# Patient Record
Sex: FEMALE | Race: WHITE | NOT HISPANIC OR LATINO | Employment: OTHER | URBAN - METROPOLITAN AREA
[De-identification: names, ages, dates, MRNs, and addresses within clinical notes are randomized per-mention and may not be internally consistent; named-entity substitution may affect disease eponyms.]

---

## 2020-07-27 ENCOUNTER — TELEPHONE ENCOUNTER (OUTPATIENT)
Dept: URBAN - METROPOLITAN AREA CLINIC 9 | Facility: CLINIC | Age: 85
End: 2020-07-27

## 2020-10-05 ENCOUNTER — TELEPHONE ENCOUNTER (OUTPATIENT)
Dept: URBAN - METROPOLITAN AREA CLINIC 9 | Facility: CLINIC | Age: 85
End: 2020-10-05

## 2020-12-18 ENCOUNTER — TELEPHONE ENCOUNTER (OUTPATIENT)
Dept: URBAN - METROPOLITAN AREA CLINIC 9 | Facility: CLINIC | Age: 85
End: 2020-12-18

## 2021-04-02 ENCOUNTER — TELEPHONE ENCOUNTER (OUTPATIENT)
Dept: URBAN - METROPOLITAN AREA CLINIC 9 | Facility: CLINIC | Age: 86
End: 2021-04-02

## 2022-03-21 ENCOUNTER — TELEPHONE ENCOUNTER (OUTPATIENT)
Dept: URBAN - METROPOLITAN AREA CLINIC 9 | Facility: CLINIC | Age: 87
End: 2022-03-21

## 2022-07-30 ENCOUNTER — TELEPHONE ENCOUNTER (OUTPATIENT)
Age: 87
End: 2022-07-30

## 2022-07-31 ENCOUNTER — TELEPHONE ENCOUNTER (OUTPATIENT)
Age: 87
End: 2022-07-31

## 2023-06-05 ENCOUNTER — TELEPHONE ENCOUNTER (OUTPATIENT)
Dept: URBAN - METROPOLITAN AREA CLINIC 9 | Facility: CLINIC | Age: 88
End: 2023-06-05

## 2023-06-05 ENCOUNTER — WEB ENCOUNTER (OUTPATIENT)
Dept: URBAN - METROPOLITAN AREA CLINIC 9 | Facility: CLINIC | Age: 88
End: 2023-06-05

## 2023-06-05 ENCOUNTER — DASHBOARD ENCOUNTERS (OUTPATIENT)
Age: 88
End: 2023-06-05

## 2023-06-05 ENCOUNTER — OFFICE VISIT (OUTPATIENT)
Dept: URBAN - METROPOLITAN AREA CLINIC 9 | Facility: CLINIC | Age: 88
End: 2023-06-05
Payer: MEDICARE

## 2023-06-05 VITALS
SYSTOLIC BLOOD PRESSURE: 110 MMHG | BODY MASS INDEX: 28.53 KG/M2 | HEIGHT: 63 IN | WEIGHT: 161 LBS | DIASTOLIC BLOOD PRESSURE: 78 MMHG

## 2023-06-05 DIAGNOSIS — R19.4 CHANGE IN BOWEL HABIT: ICD-10-CM

## 2023-06-05 DIAGNOSIS — K52.9 CHRONIC DIARRHEA: ICD-10-CM

## 2023-06-05 DIAGNOSIS — R10.13 EPIGASTRIC PAIN: ICD-10-CM

## 2023-06-05 DIAGNOSIS — Z12.11 SCREENING FOR COLON CANCER: ICD-10-CM

## 2023-06-05 DIAGNOSIS — K92.1 MELENA: ICD-10-CM

## 2023-06-05 DIAGNOSIS — K21.9 GASTRO-ESOPHAGEAL REFLUX DISEASE WITHOUT ESOPHAGITIS: ICD-10-CM

## 2023-06-05 PROCEDURE — 99204 OFFICE O/P NEW MOD 45 MIN: CPT | Performed by: INTERNAL MEDICINE

## 2023-06-05 RX ORDER — SODIUM, POTASSIUM,MAG SULFATES 17.5-3.13G
177ML SOLUTION, RECONSTITUTED, ORAL ORAL
Qty: 1 | Refills: 0 | OUTPATIENT

## 2023-06-05 RX ORDER — METOPROLOL 25 MG/1
TABLET ORAL
Qty: 180 TABLET | Status: ACTIVE | COMMUNITY

## 2023-06-05 RX ORDER — AMLODIPINE BESYLATE 5 MG/1
TABLET ORAL
Qty: 180 TABLET | Status: ACTIVE | COMMUNITY

## 2023-06-05 RX ORDER — LEVOTHYROXINE SODIUM 150 UG/1
TABLET ORAL
Qty: 90 TABLET | Status: ACTIVE | COMMUNITY

## 2023-06-05 RX ORDER — PAROXETINE HYDROCHLORIDE HEMIHYDRATE 30 MG/1
TABLET, FILM COATED ORAL
Qty: 90 TABLET | Status: ACTIVE | COMMUNITY

## 2023-06-05 RX ORDER — CLOPIDOGREL BISULFATE 75 MG/1
TAKE 1 TABLET BY MOUTH EVERY DAY TABLET, FILM COATED ORAL
Qty: 90 EACH | Refills: 2 | Status: ACTIVE | COMMUNITY

## 2023-06-05 RX ORDER — MONTELUKAST SODIUM 10 MG/1
TABLET, COATED ORAL
Qty: 30 TABLET | Status: ACTIVE | COMMUNITY

## 2023-06-05 RX ORDER — VALSARTAN 80 MG/1
TABLET ORAL
Qty: 90 TABLET | Status: ACTIVE | COMMUNITY

## 2023-06-05 NOTE — HPI-TODAY'S VISIT:
87 year old female hre for egd and colononscopy darius. Has a hx of Hiatal hernia and GERD. No recent labs or rads Patient is referred for diarrhea. We had seen her in the past and she has had gastric ulcers.  I did an EGD in 2021 which showed gastric ulcers had healed.  She was on Protonix.  She said she stopped the Protonix and started having that feeling again.  She gets this midepigastric burning mainly at night its may be related to eating she is not sure.  She will take a Tums and will get better.  She restarted the Protonix.  She is on Plavix for what sounds like A-fib.  She also had fecal occult blood test done by her primary care which was positive.  Last colonoscopy was 5 to 10 years ago.  She is also noticed a change in her bowel habits.  She is to have formed stools but now they are looser.  Really every day she will have several loose stools.  Sometimes they are floating.  No new medications since this all started.  No weight loss.  No family history of GI malignancies or disorders. She denies any NSAID use.  She does not really have a lot of reflux this morning very intermittent.  She had 1 episode of melena which led to the fecal occult blood testing

## 2023-06-06 PROBLEM — 266435005: Status: ACTIVE | Noted: 2023-06-06

## 2023-06-24 LAB
IMMUNOGLOBULIN A: 222
INTERPRETATION: (no result)
TISSUE TRANSGLUTAMINASE AB, IGA: <1

## 2023-06-27 ENCOUNTER — OFFICE VISIT (OUTPATIENT)
Dept: URBAN - METROPOLITAN AREA SURGERY CENTER 9 | Facility: SURGERY CENTER | Age: 88
End: 2023-06-27
Payer: MEDICARE

## 2023-06-27 ENCOUNTER — CLAIMS CREATED FROM THE CLAIM WINDOW (OUTPATIENT)
Dept: URBAN - METROPOLITAN AREA CLINIC 4 | Facility: CLINIC | Age: 88
End: 2023-06-27
Payer: MEDICARE

## 2023-06-27 DIAGNOSIS — R19.7 ACUTE DIARRHEA: ICD-10-CM

## 2023-06-27 DIAGNOSIS — R19.7 CHRONIC DIARRHEA: ICD-10-CM

## 2023-06-27 DIAGNOSIS — K57.30 DIVERTICULOSIS OF LARGE INTESTINE WITHOUT PERFORATION OR ABSCESS WITHOUT BLEEDING: ICD-10-CM

## 2023-06-27 DIAGNOSIS — K44.9 DIAPHRAGMATIC HERNIA WITHOUT OBSTRUCTION OR GANGRENE: ICD-10-CM

## 2023-06-27 DIAGNOSIS — K64.8 OTHER HEMORRHOIDS: ICD-10-CM

## 2023-06-27 DIAGNOSIS — K29.60 OTHER GASTRITIS WITHOUT BLEEDING: ICD-10-CM

## 2023-06-27 DIAGNOSIS — K63.89 OTHER SPECIFIED DISEASES OF INTESTINE: ICD-10-CM

## 2023-06-27 DIAGNOSIS — R10.13 EPIGASTRIC PAIN: ICD-10-CM

## 2023-06-27 DIAGNOSIS — K29.70 GASTRITIS WITHOUT BLEEDING, UNSPECIFIED CHRONICITY, UNSPECIFIED GASTRITIS TYPE: ICD-10-CM

## 2023-06-27 DIAGNOSIS — K22.10 ULCER OF ESOPHAGUS WITHOUT BLEEDING: ICD-10-CM

## 2023-06-27 DIAGNOSIS — K22.10 ESOPHAGEAL ULCER: ICD-10-CM

## 2023-06-27 PROCEDURE — 88305 TISSUE EXAM BY PATHOLOGIST: CPT | Performed by: PATHOLOGY

## 2023-06-27 PROCEDURE — 88313 SPECIAL STAINS GROUP 2: CPT | Performed by: PATHOLOGY

## 2023-06-27 PROCEDURE — 45380 COLONOSCOPY AND BIOPSY: CPT | Performed by: CLINIC/CENTER

## 2023-06-27 PROCEDURE — 43235 EGD DIAGNOSTIC BRUSH WASH: CPT | Performed by: CLINIC/CENTER

## 2023-06-27 PROCEDURE — 43235 EGD DIAGNOSTIC BRUSH WASH: CPT | Performed by: INTERNAL MEDICINE

## 2023-06-27 PROCEDURE — 45380 COLONOSCOPY AND BIOPSY: CPT | Performed by: INTERNAL MEDICINE

## 2023-06-27 PROCEDURE — 00813 ANES UPR LWR GI NDSC PX: CPT | Performed by: NURSE ANESTHETIST, CERTIFIED REGISTERED

## 2023-06-27 PROCEDURE — 88342 IMHCHEM/IMCYTCHM 1ST ANTB: CPT | Performed by: PATHOLOGY

## 2023-06-27 PROCEDURE — INT INTEREST PAYMENT: Performed by: PATHOLOGY

## 2023-06-27 RX ORDER — PAROXETINE HYDROCHLORIDE HEMIHYDRATE 30 MG/1
TABLET, FILM COATED ORAL
Qty: 90 TABLET | Status: ACTIVE | COMMUNITY

## 2023-06-27 RX ORDER — METOPROLOL 25 MG/1
TABLET ORAL
Qty: 180 TABLET | Status: ACTIVE | COMMUNITY

## 2023-06-27 RX ORDER — CLOPIDOGREL BISULFATE 75 MG/1
TAKE 1 TABLET BY MOUTH EVERY DAY TABLET, FILM COATED ORAL
Qty: 90 EACH | Refills: 2 | Status: ACTIVE | COMMUNITY

## 2023-06-27 RX ORDER — MONTELUKAST SODIUM 10 MG/1
TABLET, COATED ORAL
Qty: 30 TABLET | Status: ACTIVE | COMMUNITY

## 2023-06-27 RX ORDER — AMLODIPINE BESYLATE 5 MG/1
TABLET ORAL
Qty: 180 TABLET | Status: ACTIVE | COMMUNITY

## 2023-06-27 RX ORDER — VALSARTAN 80 MG/1
TABLET ORAL
Qty: 90 TABLET | Status: ACTIVE | COMMUNITY

## 2023-06-27 RX ORDER — SODIUM, POTASSIUM,MAG SULFATES 17.5-3.13G
177ML SOLUTION, RECONSTITUTED, ORAL ORAL
Qty: 1 | Refills: 0 | Status: ACTIVE | COMMUNITY

## 2023-06-27 RX ORDER — LEVOTHYROXINE SODIUM 150 UG/1
TABLET ORAL
Qty: 90 TABLET | Status: ACTIVE | COMMUNITY

## 2023-06-28 ENCOUNTER — TELEPHONE ENCOUNTER (OUTPATIENT)
Dept: URBAN - METROPOLITAN AREA CLINIC 9 | Facility: CLINIC | Age: 88
End: 2023-06-28

## 2023-06-28 PROBLEM — 30811009: Status: ACTIVE | Noted: 2023-06-28

## 2023-06-28 RX ORDER — METOPROLOL 25 MG/1
TABLET ORAL
Qty: 180 TABLET | Status: ACTIVE | COMMUNITY

## 2023-06-28 RX ORDER — LEVOTHYROXINE SODIUM 150 UG/1
TABLET ORAL
Qty: 90 TABLET | Status: ACTIVE | COMMUNITY

## 2023-06-28 RX ORDER — CLOPIDOGREL BISULFATE 75 MG/1
TAKE 1 TABLET BY MOUTH EVERY DAY TABLET, FILM COATED ORAL
Qty: 90 EACH | Refills: 2 | Status: ACTIVE | COMMUNITY

## 2023-06-28 RX ORDER — PANTOPRAZOLE SODIUM 40 MG/1
1 TABLET TABLET, DELAYED RELEASE ORAL TWICE A DAY
Qty: 120 TABLET | Refills: 0 | OUTPATIENT
Start: 2023-06-28

## 2023-06-28 RX ORDER — SODIUM, POTASSIUM,MAG SULFATES 17.5-3.13G
177ML SOLUTION, RECONSTITUTED, ORAL ORAL
Qty: 1 | Refills: 0 | Status: ACTIVE | COMMUNITY

## 2023-06-28 RX ORDER — PAROXETINE HYDROCHLORIDE HEMIHYDRATE 30 MG/1
TABLET, FILM COATED ORAL
Qty: 90 TABLET | Status: ACTIVE | COMMUNITY

## 2023-06-28 RX ORDER — MONTELUKAST SODIUM 10 MG/1
TABLET, COATED ORAL
Qty: 30 TABLET | Status: ACTIVE | COMMUNITY

## 2023-06-28 RX ORDER — AMLODIPINE BESYLATE 5 MG/1
TABLET ORAL
Qty: 180 TABLET | Status: ACTIVE | COMMUNITY

## 2023-06-28 RX ORDER — VALSARTAN 80 MG/1
TABLET ORAL
Qty: 90 TABLET | Status: ACTIVE | COMMUNITY

## 2023-06-30 PROBLEM — 724538004: Status: ACTIVE | Noted: 2023-06-30

## 2023-06-30 PROBLEM — 4556007: Status: ACTIVE | Noted: 2023-06-30

## 2023-08-21 ENCOUNTER — OFFICE VISIT (OUTPATIENT)
Dept: URBAN - METROPOLITAN AREA SURGERY CENTER 9 | Facility: SURGERY CENTER | Age: 88
End: 2023-08-21
Payer: MEDICARE

## 2023-08-21 ENCOUNTER — TELEPHONE ENCOUNTER (OUTPATIENT)
Dept: URBAN - METROPOLITAN AREA CLINIC 9 | Facility: CLINIC | Age: 88
End: 2023-08-21

## 2023-08-21 ENCOUNTER — CLAIMS CREATED FROM THE CLAIM WINDOW (OUTPATIENT)
Dept: URBAN - METROPOLITAN AREA CLINIC 4 | Facility: CLINIC | Age: 88
End: 2023-08-21
Payer: MEDICARE

## 2023-08-21 DIAGNOSIS — K22.89 OTHER SPECIFIED DISEASE OF ESOPHAGUS: ICD-10-CM

## 2023-08-21 DIAGNOSIS — K44.9 DIAPHRAGMATIC HERNIA WITHOUT OBSTRUCTION OR GANGRENE: ICD-10-CM

## 2023-08-21 DIAGNOSIS — F41.9 ANXIETY: ICD-10-CM

## 2023-08-21 PROCEDURE — 43239 EGD BIOPSY SINGLE/MULTIPLE: CPT | Performed by: CLINIC/CENTER

## 2023-08-21 PROCEDURE — 43239 EGD BIOPSY SINGLE/MULTIPLE: CPT | Performed by: INTERNAL MEDICINE

## 2023-08-21 PROCEDURE — 00731 ANES UPR GI NDSC PX NOS: CPT | Performed by: NURSE ANESTHETIST, CERTIFIED REGISTERED

## 2023-08-21 PROCEDURE — 88305 TISSUE EXAM BY PATHOLOGIST: CPT | Performed by: PATHOLOGY

## 2023-08-21 RX ORDER — SODIUM, POTASSIUM,MAG SULFATES 17.5-3.13G
177ML SOLUTION, RECONSTITUTED, ORAL ORAL
Qty: 1 | Refills: 0 | Status: ACTIVE | COMMUNITY

## 2023-08-21 RX ORDER — LEVOTHYROXINE SODIUM 150 UG/1
TABLET ORAL
Qty: 90 TABLET | Status: ACTIVE | COMMUNITY

## 2023-08-21 RX ORDER — MONTELUKAST SODIUM 10 MG/1
TABLET, COATED ORAL
Qty: 30 TABLET | Status: ACTIVE | COMMUNITY

## 2023-08-21 RX ORDER — CLOPIDOGREL BISULFATE 75 MG/1
TAKE 1 TABLET BY MOUTH EVERY DAY TABLET, FILM COATED ORAL
Qty: 90 EACH | Refills: 2 | Status: ACTIVE | COMMUNITY

## 2023-08-21 RX ORDER — AMLODIPINE BESYLATE 5 MG/1
TABLET ORAL
Qty: 180 TABLET | Status: ACTIVE | COMMUNITY

## 2023-08-21 RX ORDER — PAROXETINE HYDROCHLORIDE HEMIHYDRATE 30 MG/1
TABLET, FILM COATED ORAL
Qty: 90 TABLET | Status: ACTIVE | COMMUNITY

## 2023-08-21 RX ORDER — VALSARTAN 80 MG/1
TABLET ORAL
Qty: 90 TABLET | Status: ACTIVE | COMMUNITY

## 2023-08-21 RX ORDER — PANTOPRAZOLE SODIUM 40 MG/1
1 TABLET TABLET, DELAYED RELEASE ORAL TWICE A DAY
Qty: 120 TABLET | Refills: 0 | Status: ACTIVE | COMMUNITY
Start: 2023-06-28

## 2023-08-21 RX ORDER — METOPROLOL 25 MG/1
TABLET ORAL
Qty: 180 TABLET | Status: ACTIVE | COMMUNITY

## 2023-08-24 PROBLEM — 48694002: Status: ACTIVE | Noted: 2023-08-24

## 2023-08-28 ENCOUNTER — TELEPHONE ENCOUNTER (OUTPATIENT)
Dept: URBAN - METROPOLITAN AREA CLINIC 9 | Facility: CLINIC | Age: 88
End: 2023-08-28

## 2023-11-03 ENCOUNTER — TELEPHONE ENCOUNTER (OUTPATIENT)
Dept: URBAN - METROPOLITAN AREA CLINIC 9 | Facility: CLINIC | Age: 88
End: 2023-11-03